# Patient Record
Sex: MALE | Race: OTHER | Employment: FULL TIME | URBAN - METROPOLITAN AREA
[De-identification: names, ages, dates, MRNs, and addresses within clinical notes are randomized per-mention and may not be internally consistent; named-entity substitution may affect disease eponyms.]

---

## 2019-02-05 NOTE — PROGRESS NOTES
CC: pneumonia    History of Present Illness:  Morenita Baer is a 29 y.o. male. Was seen at [facility] on 1/7/19. The [facility] note is reviewed and reveals he had a CXR which was suggestive of RLL pneumonia. He was prescribed 10 days of bid doxycycline and prn tessalon perles. He did complete the course of doxycycline. His symptoms resolved. He denies fevers, chills, chest pain, shortness of breath, cough. He also notes \"spots\" on his arm. One red, small spot on left ventral forearm that has been there for \"years. \" Another on his right upper outer arm that is \"newer\" over the past few months; also red and small. Denies itching, pain, drainage. He asks for a dermatology referral.     No Known Allergies    There is no problem list on file for this patient. No past medical history on file. Past Surgical History:   Procedure Laterality Date    HX APPENDECTOMY      laparoscopic appendectomy    HX ORTHOPAEDIC  1993    external fixator to left femur     Social History     Socioeconomic History    Marital status: SINGLE     Spouse name: Not on file    Number of children: Not on file    Years of education: Not on file    Highest education level: Not on file   Tobacco Use    Smoking status: Never Smoker    Smokeless tobacco: Never Used   Substance and Sexual Activity    Alcohol use: No    Drug use: No    Sexual activity: No   Social History Narrative    ** Merged History Encounter **          Family History   Problem Relation Age of Onset    Diabetes Mother     Hypertension Mother     Arthritis-osteo Mother          Review of Systems   Constitutional: Negative for chills and fever. Respiratory: Negative for cough, sputum production, shortness of breath and wheezing. Cardiovascular: Negative for chest pain.    Skin:        \"spot\" on arms       Physical Exam:  Visit Vitals  /85 (BP 1 Location: Right arm, BP Patient Position: Sitting)   Pulse 92   Temp 98.4 °F (36.9 °C) (Oral) Resp 18   Ht 6' 1\" (1.854 m)   Wt 292 lb (132.5 kg)   SpO2 98%   BMI 38.52 kg/m²     Physical Exam   Constitutional: He appears well-developed and well-nourished. No distress. Cardiovascular: Normal rate, regular rhythm and normal heart sounds. Pulmonary/Chest: Effort normal and breath sounds normal. No respiratory distress. He has no wheezes. Skin:   Red papule on right ventral forearm  Red papule on right upper arm, appears slightly irritated but no drainage or surrounding erythema         Assessment/Plan:  1. Pneumonia of right lower lobe due to infectious organism [facility])  Doing well, feeling better. Check CXR. - XR CHEST PA LAT; Future    2. Cherry angioma  Will refer to dermatology at his request.   - REFERRAL TO DERMATOLOGY        Follow-up Disposition:  Return if symptoms worsen or fail to improve. Diagnoses, tests, plan, and follow up discussed with patient. I have given to and reviewed with the patient the after visit summary. Patient expressed agreement and understanding. All questions were answered. Discussed with Dr. Fran Willingham.

## 2019-02-06 ENCOUNTER — OFFICE VISIT (OUTPATIENT)
Dept: FAMILY MEDICINE CLINIC | Age: 34
End: 2019-02-06

## 2019-02-06 ENCOUNTER — HOSPITAL ENCOUNTER (OUTPATIENT)
Dept: GENERAL RADIOLOGY | Age: 34
Discharge: HOME | End: 2019-02-06
Payer: COMMERCIAL

## 2019-02-06 VITALS
OXYGEN SATURATION: 98 % | RESPIRATION RATE: 18 BRPM | SYSTOLIC BLOOD PRESSURE: 121 MMHG | BODY MASS INDEX: 38.7 KG/M2 | DIASTOLIC BLOOD PRESSURE: 85 MMHG | TEMPERATURE: 98.4 F | WEIGHT: 292 LBS | HEIGHT: 73 IN | HEART RATE: 92 BPM

## 2019-02-06 DIAGNOSIS — J18.9 PNEUMONIA OF RIGHT LOWER LOBE DUE TO INFECTIOUS ORGANISM: Primary | ICD-10-CM

## 2019-02-06 DIAGNOSIS — J18.9 PNEUMONIA OF RIGHT LOWER LOBE DUE TO INFECTIOUS ORGANISM: ICD-10-CM

## 2019-02-06 DIAGNOSIS — D18.01 CHERRY ANGIOMA: ICD-10-CM

## 2019-02-06 PROCEDURE — 71046 X-RAY EXAM CHEST 2 VIEWS: CPT

## 2019-02-06 RX ORDER — PREDNISONE 20 MG/1
TABLET ORAL
Refills: 0 | COMMUNITY
Start: 2019-01-07 | End: 2019-02-06 | Stop reason: ALTCHOICE

## 2019-02-06 NOTE — PROGRESS NOTES
Brittany Dominique is a 29 y.o. male      Chief Complaint   Patient presents with    Follow-up     Urgent care( pneumonia )          1. Have you been to the ER, urgent care clinic since your last visit? Hospitalized since your last visit? Yes pneumonia       2. Have you seen or consulted any other health care providers outside of the [address] [place] [address] since your last visit? Include any pap smears or colon screening.    no

## 2019-02-06 NOTE — PATIENT INSTRUCTIONS
Pneumonia: Care Instructions  Your Care Instructions    Pneumonia is an infection of the lungs. Most cases are caused by infections from bacteria or viruses. Pneumonia may be mild or very severe. If it is caused by bacteria, you will be treated with antibiotics. It may take a few weeks to a few months to recover fully from pneumonia, depending on how sick you were and whether your overall health is good. Follow-up care is a key part of your treatment and safety. Be sure to make and go to all appointments, and call your doctor if you are having problems. It's also a good idea to know your test results and keep a list of the medicines you take. How can you care for yourself at home? · Take your antibiotics exactly as directed. Do not stop taking the medicine just because you are feeling better. You need to take the full course of antibiotics. · Take your medicines exactly as prescribed. Call your doctor if you think you are having a problem with your medicine. · Get plenty of rest and sleep. You may feel weak and tired for a while, but your energy level will improve with time. · To prevent dehydration, drink plenty of fluids, enough so that your urine is light yellow or clear like water. Choose water and other caffeine-free clear liquids until you feel better. If you have kidney, heart, or liver disease and have to limit fluids, talk with your doctor before you increase the amount of fluids you drink. · Take care of your cough so you can rest. A cough that brings up mucus from your lungs is common with pneumonia. It is one way your body gets rid of the infection. But if coughing keeps you from resting or causes severe fatigue and chest-wall pain, talk to your doctor. He or she may suggest that you take a medicine to reduce the cough. · Use a vaporizer or humidifier to add moisture to your bedroom. Follow the directions for cleaning the machine. · Do not smoke or allow others to smoke around you.  Smoke will make your cough last longer. If you need help quitting, talk to your doctor about stop-smoking programs and medicines. These can increase your chances of quitting for good. · Take an over-the-counter pain medicine, such as acetaminophen (Tylenol), ibuprofen (Advil, Motrin), or naproxen (Aleve). Read and follow all instructions on the label. · Do not take two or more pain medicines at the same time unless the doctor told you to. Many pain medicines have acetaminophen, which is Tylenol. Too much acetaminophen (Tylenol) can be harmful. · If you were given a spirometer to measure how well your lungs are working, use it as instructed. This can help your doctor tell how your recovery is going. · To prevent pneumonia in the future, talk to your doctor about getting a flu vaccine (once a year) and a pneumococcal vaccine (one time only for most people). When should you call for help? Call 911 anytime you think you may need emergency care. For example, call if:    · You have severe trouble breathing.    Call your doctor now or seek immediate medical care if:    · You cough up dark brown or bloody mucus (sputum).     · You have new or worse trouble breathing.     · You are dizzy or lightheaded, or you feel like you may faint.    Watch closely for changes in your health, and be sure to contact your doctor if:    · You have a new or higher fever.     · You are coughing more deeply or more often.     · You are not getting better after 2 days (48 hours).     · You do not get better as expected. Where can you learn more? Go to http://mable-braulio.info/. Enter [id number] in the search box to learn more about \"Pneumonia: Care Instructions. \"  Current as of: September 5, 2018  Content Version: 11.9  © 6833-0788 [employer]. Care instructions adapted under license by [employer] (which disclaims liability or warranty for this information).  If you have questions about a medical condition or this instruction, always ask your healthcare professional. [place] [address] any warranty or liability for your use of this information.

## 2019-02-06 NOTE — PROGRESS NOTES
Called patient and left detailed message on patients phone that results were negative, no signs of pneumonia.  Gave call back number for future questions or concerns

## 2020-11-27 ENCOUNTER — TELEPHONE (OUTPATIENT)
Dept: FAMILY MEDICINE CLINIC | Age: 35
End: 2020-11-27

## 2020-11-27 NOTE — TELEPHONE ENCOUNTER
----- Message from Ani Baumann sent at 11/27/2020 12:06 PM EST -----  Regarding: Dr. Jonathan Hood first and last name and relationship (if not the patient): N/A  Best contact number(s): [phone]  Whose call is being returned: Selina  Details to clarify the request: Pt needs to schedule apt for vomiting and dizziness.  Pt wants a physical but he needs to confirm with office if he needs a VV first.

## 2020-11-27 NOTE — TELEPHONE ENCOUNTER
----- Message from Kourtney Raya sent at 11/27/2020 11:08 AM EST -----  Regarding: Dr Mp Turner Message/Vendor Calls    Caller's first and last name: n/a      Reason for call: NP Apt      Callback required yes/no and why: yes      Best contact number(s): [phone]      Details to clarify the request: Scheduling block on NP appointment for Dec 9th. Times are listed incorrectly. Pt has been seen at this location before due to pneumonia however never established a primary care doctor. Pt would like to establish care ASAP and get a CPE.        Kourtney Raya

## 2020-11-27 NOTE — TELEPHONE ENCOUNTER
LVM to let pt know that an VV is best first and the there are current;y 2 appt left to schedule today for an V.     Also let him know of 2 current slots and if they are ok to schedule to have them inform use in the message for an VV